# Patient Record
Sex: FEMALE | Race: WHITE | Employment: FULL TIME | URBAN - METROPOLITAN AREA
[De-identification: names, ages, dates, MRNs, and addresses within clinical notes are randomized per-mention and may not be internally consistent; named-entity substitution may affect disease eponyms.]

---

## 2022-02-23 ENCOUNTER — HOSPITAL ENCOUNTER (OUTPATIENT)
Age: 67
Setting detail: OUTPATIENT SURGERY
Discharge: HOME | End: 2022-02-23
Attending: INTERNAL MEDICINE | Admitting: INTERNAL MEDICINE
Payer: MEDICARE

## 2022-02-23 VITALS
HEIGHT: 63 IN | TEMPERATURE: 97.9 F | HEART RATE: 81 BPM | OXYGEN SATURATION: 100 % | WEIGHT: 156.4 LBS | BODY MASS INDEX: 27.71 KG/M2 | DIASTOLIC BLOOD PRESSURE: 63 MMHG | SYSTOLIC BLOOD PRESSURE: 135 MMHG | RESPIRATION RATE: 16 BRPM

## 2022-02-23 PROCEDURE — 88305 TISSUE EXAM BY PATHOLOGIST: CPT

## 2022-02-23 PROCEDURE — G0500 MOD SEDAT ENDO SERVICE >5YRS: HCPCS | Performed by: INTERNAL MEDICINE

## 2022-02-23 PROCEDURE — 77030013991 HC SNR POLYP ENDOSC BSC -A: Performed by: INTERNAL MEDICINE

## 2022-02-23 PROCEDURE — 2709999900 HC NON-CHARGEABLE SUPPLY: Performed by: INTERNAL MEDICINE

## 2022-02-23 PROCEDURE — 77030021593 HC FCPS BIOP ENDOSC BSC -A: Performed by: INTERNAL MEDICINE

## 2022-02-23 PROCEDURE — 99153 MOD SED SAME PHYS/QHP EA: CPT | Performed by: INTERNAL MEDICINE

## 2022-02-23 PROCEDURE — 74011250636 HC RX REV CODE- 250/636: Performed by: INTERNAL MEDICINE

## 2022-02-23 PROCEDURE — 76040000007: Performed by: INTERNAL MEDICINE

## 2022-02-23 RX ORDER — SODIUM CHLORIDE 0.9 % (FLUSH) 0.9 %
5-40 SYRINGE (ML) INJECTION EVERY 8 HOURS
Status: CANCELLED | OUTPATIENT
Start: 2022-02-23

## 2022-02-23 RX ORDER — ATROPINE SULFATE 0.1 MG/ML
0.5 INJECTION INTRAVENOUS
Status: CANCELLED | OUTPATIENT
Start: 2022-02-23 | End: 2022-02-24

## 2022-02-23 RX ORDER — FLUMAZENIL 0.1 MG/ML
0.2 INJECTION INTRAVENOUS
Status: CANCELLED | OUTPATIENT
Start: 2022-02-23 | End: 2022-02-23

## 2022-02-23 RX ORDER — SODIUM CHLORIDE 9 MG/ML
125 INJECTION, SOLUTION INTRAVENOUS CONTINUOUS
Status: DISCONTINUED | OUTPATIENT
Start: 2022-02-23 | End: 2022-02-23 | Stop reason: HOSPADM

## 2022-02-23 RX ORDER — MIDAZOLAM HYDROCHLORIDE 1 MG/ML
INJECTION, SOLUTION INTRAMUSCULAR; INTRAVENOUS AS NEEDED
Status: DISCONTINUED | OUTPATIENT
Start: 2022-02-23 | End: 2022-02-23 | Stop reason: HOSPADM

## 2022-02-23 RX ORDER — DEXTROMETHORPHAN/PSEUDOEPHED 2.5-7.5/.8
1.2 DROPS ORAL
Status: CANCELLED | OUTPATIENT
Start: 2022-02-23

## 2022-02-23 RX ORDER — SODIUM CHLORIDE 0.9 % (FLUSH) 0.9 %
5-40 SYRINGE (ML) INJECTION AS NEEDED
Status: CANCELLED | OUTPATIENT
Start: 2022-02-23

## 2022-02-23 RX ORDER — ZOLPIDEM TARTRATE 10 MG/1
TABLET ORAL
COMMUNITY
Start: 2022-02-15

## 2022-02-23 RX ORDER — EPINEPHRINE 0.1 MG/ML
1 INJECTION INTRACARDIAC; INTRAVENOUS
Status: CANCELLED | OUTPATIENT
Start: 2022-02-23 | End: 2022-02-24

## 2022-02-23 RX ORDER — NALOXONE HYDROCHLORIDE 0.4 MG/ML
0.4 INJECTION, SOLUTION INTRAMUSCULAR; INTRAVENOUS; SUBCUTANEOUS
Status: CANCELLED | OUTPATIENT
Start: 2022-02-23 | End: 2022-02-23

## 2022-02-23 RX ORDER — FENTANYL CITRATE 50 UG/ML
INJECTION, SOLUTION INTRAMUSCULAR; INTRAVENOUS AS NEEDED
Status: DISCONTINUED | OUTPATIENT
Start: 2022-02-23 | End: 2022-02-23 | Stop reason: HOSPADM

## 2022-02-23 RX ADMIN — SODIUM CHLORIDE 125 ML/HR: 9 INJECTION, SOLUTION INTRAVENOUS at 15:37

## 2022-02-23 NOTE — DISCHARGE INSTRUCTIONS
Dominga Anne  [id number]  1955    COLON DISCHARGE INSTRUCTIONS    Discomfort:  Redness at IV site- apply warm compress to area; if redness or soreness persist- contact your physician  There may be a slight amount of blood passed from the rectum  Gaseous discomfort- walking, belching will help relieve any discomfort  You should not operate a vehicle for 12 hours  You should not engage in an occupation involving machinery or appliances for rest of today  You may not drink alcoholic beverages for at least 12 hours  Avoid making any critical decisions for at least 24 hour  DIET:   Regular diet. - however -  remember your colon is empty and a heavy meal will produce gas. Avoid these foods:  vegetables, fried / greasy foods, carbonated drinks for today     ACTIVITY:  You may resume your normal daily activities it is recommended that you spend the remainder of the day resting -  avoid any strenuous activity. CALL M.D. ANY SIGN OF:   Increasing pain, nausea, vomiting  Abdominal distension (swelling)  New increased bleeding (oral or rectal)  Fever (chills)  Pain in chest area  Bloody discharge from nose or mouth  Shortness of breath      Follow-up Instructions: Will call or send letter with polyp pathology results and when next colonoscopy due. Dominga Anne  [id number]  1955        DISCHARGE SUMMARY from Nurse    The following personal items collected during your admission are returned to you:   Dental Appliance: Dental Appliances: None  Vision: Visual Aid: Contacts (pt chooses to wear contacts and not remove)  Hearing Aid:    Jewelry:    Clothing:    Other Valuables:    Valuables sent to safe:      {Medication reconciliation information is now added to the patient's AVS automatically when it is printed. There is no need to use this SmartLink in discharge instructions.   Highlight this text and delete it to clear this message}    DISCHARGE SUMMARY from Nurse    PATIENT INSTRUCTIONS:    After general anesthesia or intravenous sedation, for 24 hours or while taking prescription Narcotics:  · Limit your activities  · Do not drive and operate hazardous machinery  · Do not make important personal or business decisions  · Do  not drink alcoholic beverages  · If you have not urinated within 8 hours after discharge, please contact your surgeon on call. Report the following to your surgeon:  · Excessive pain, swelling, redness or odor of or around the surgical area  · Temperature over 100.5  · Nausea and vomiting lasting longer than 4 hours or if unable to take medications  · Any signs of decreased circulation or nerve impairment to extremity: change in color, persistent  numbness, tingling, coldness or increase pain  · Any questions    What to do at Home:  Recommended activity: {discharge activity:55805}, as above    If you experience any of the following symptoms as above, please follow up with Dr. Bill Walsh. *  Please give a list of your current medications to your Primary Care Provider. *  Please update this list whenever your medications are discontinued, doses are      changed, or new medications (including over-the-counter products) are added. *  Please carry medication information at all times in case of emergency situations. These are general instructions for a healthy lifestyle:    No smoking/ No tobacco products/ Avoid exposure to second hand smoke  Surgeon General's Warning:  Quitting smoking now greatly reduces serious risk to your health.     Obesity, smoking, and sedentary lifestyle greatly increases your risk for illness    A healthy diet, regular physical exercise & weight monitoring are important for maintaining a healthy lifestyle    You may be retaining fluid if you have a history of heart failure or if you experience any of the following symptoms:  Weight gain of 3 pounds or more overnight or 5 pounds in a week, increased swelling in our hands or feet or shortness of breath while lying flat in bed. Please call your doctor as soon as you notice any of these symptoms; do not wait until your next office visit. The discharge information has been reviewed with the patient and spouse. The patient and spouse verbalized understanding. Discharge medications reviewed with the patient and spouse and appropriate educational materials and side effects teaching were provided.   ___________________________________________________________________________________________________________________________________

## 2022-02-23 NOTE — PROCEDURES
Colonoscopy Procedure Note    Indications: Previous adenomatous polyp    Anesthesia/Sedation: Versed 7 mg IV and Fentanyl 150 mcg IV    Pre-Procedure Exam:  Airway: clear   Heart: normal S1and S2    Lungs: clear bilateral  Abdomen: soft, nontender, bowel sounds present and normal in all quadrants   Mental Status: awake, alert, and oriented to person, place, and time      Procedure in Detail:  Informed consent was obtained for the procedure, including sedation. Risks of perforation, hemorrhage, adverse drug reaction, and aspiration were discussed. The patient was placed in the left lateral decubitus position. Based on the pre-procedure assessment, including review of the patient's medical history, medications, allergies, and review of systems, she had been deemed to be an appropriate candidate for moderate sedation; she was therefore sedated with the medications listed above. The patient was monitored continuously with ECG tracing, pulse oximetry, blood pressure monitoring, and direct observations. A rectal examination was performed. The  was inserted, changed secondary tortuous sigmoid to [id number] was inserted into the rectum and advanced with aid of sigmoid pressure under direct vision to the cecum, which was identified by the ileocecal valve and appendiceal orifice. The quality of the colonic preparation was excellent. A careful inspection was made as the colonoscope was withdrawn, including a retroflexed view of the rectum; findings and interventions are described below. Appropriate photodocumentation was obtained. ANUS: Anal exam reveals no masses or hemorrhoids, sphincter tone is normal.   RECTUM: Rectal exam reveals no masses or hemorrhoids. 5mm polyp removed with cold snare  SIGMOID COLON: The mucosa is normal with good vascular pattern and without ulcers and polyps.  Diverticulosis mild  DESCENDING COLON: The mucosa is normal with good vascular pattern and without ulcers, diverticula, and polyps. SPLENIC FLEXURE: The splenic flexure is normal.   TRANSVERSE COLON: The mucosa is normal with good vascular pattern and without ulcers, diverticula, and polyps. HEPATIC FLEXURE: The hepatic flexure is normal.   ASCENDING COLON: The mucosa is normal with good vascular pattern and without ulcers, diverticula  6mm polyp removed with cold snare  CECUM: The appendiceal orifice appears normal. The ileocecal valve appears normal.   TERMINAL ILEUM: The terminal ileum was not entered. Specimens: Specimens were collected and sent to pathology. EBL: None    No prosthetic devices, grafts, tissues, transplant or devices implanted. Withdrawal Time: 11 min    Complications: None; patient tolerated the procedure well. Attending Attestation: I performed the procedure. Recommendations:   - Await pathology.     Signed By: Becky Ortiz MD                      2/23/2022

## 2022-02-23 NOTE — H&P
[facility] [address] [place] [address] [address]  Tel: [phone]  Fax: [phone]    Patient:  Armando Mendoza  YOB: 1955  Birth Sex:  Female  Current Gender:  Female  Date:   11/09/2021 2:20 PM   Historian:    self  Visit Type:   Office Visit          Completed Orders (This Visit)  Order  Details  Reason  Side  Interpretation  Result  Initial Treatment Date  Region  Referrals: Gastroenterology. Carmela Bertrand MD. Evaluate and treat                Weight monitoring                Dietary management education, guidance, and counseling                  Assessment/Plan  #  Detail Type  Description   1. Assessment  Personal history of colonic polyps (Z86.010). Patient Plan  This pleasant 78year old lady is referred by Rei Anguiano for repeat screening colonoscopy. Her last colo was in 2014 with findings of small sessile polyp, diverticulosis of the sigmoid and hemorrhoids with recommendation to return in 5 years. Pertinent negatives include family history of CRC, personal history of cancer, changes in bowel habits, hematochezia, abdominal pain, n&v and unintentional weight loss. She also denies acid reflux and dysphagia. Schedule for COLO with MAC, MiraLax prep. The risks, benefits, adverse reactions were discussed in detail today with the patient. This includes, but is not limited to, the risk of bleeding, infections, perforation, death, missed lesions, reactions to anesthesia/sedation, other. Patient understands and agrees to undergo the procedure         2. Assessment  Body mass index (BMI) 28.0-28.9, adult (A77.35). Plan Orders  Today's instructions / counseling include(s) Dietary management education, guidance, and counseling. Weight monitoring              This 78year old  patient was referred by Flo Avina. This 78year old female presents for Colon Cancer Screening. History of Present Illness  1.   Colon Cancer Screening Prior screening:  Colonoscopy 10/29/2014. Risk Factors: History of Polyp(s). There are no associated symptoms. Pertinent negatives include abdominal pain, change in bowel habits, change in stool caliber, constipation, decreased appetite, diarrhea, melena, nausea, rectal bleeding, vomiting, weight gain and weight loss. Additional information: Colonoscopy 10/29/2014 notated a small Sessile polyp and Diverticulosis and small Internal Hemorrhoids. Problem List  Problem List reviewed. 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 Colon Cancer Screening    Colonoscopy  10/29/2014  Small Sessile polyp, Diverticulosis, Small internal Hemorrhoids       Section          Obstetric History  Not currently pregnant. Family History   (Detailed)    Relationship  Family Member Name    Age at Death  Condition  Onset Age  Cause of Death  Father    N    THROAT CANCER  59  N  Father    Y    Cancer, lung    Y  Mother    N    Hypertension  71  N    Social History  (Detailed)  Tobacco use reviewed. Preferred language is [place]. The patient does not need an .       Education/Employment/Occupation  Employment  History  Status  Retired  Restrictions  [employer]            Marital Status/Family/Social Support  Marital status:     Tobacco use status: Never smoked tobacco.    Smoking status: Never smoker. Tobacco Screening  Patient has never used tobacco. Patient has not used tobacco in the last 30 days. Patient has not used smokeless tobacco in the last 30 days. Smoking Status  Type  Smoking Status  Usage Per Day  Years Used  Pack Years  Total Pack Years    Never smoker            Vaping Use  Screened for vaping? Yes  Status: Not a current user    Tobacco/Vaping Exposure  No passive smoke exposure. Alcohol  There is a history of alcohol use. consumed socially. Caffeine  The patient uses caffeine: Occasional [employer]. Christianity/Spiritual  Patient agrees to transfusion.        Medications (active prior to today)  Medication  Instructions  Start Date  Stop Date  Refilled  Elsewhere  multivitamin capsule  take 1 capsule by oral route  every day  12/04/2012      N  Fish Oil 1,000 mg capsule  take 1 by Oral route  12/04/2012      N  loratadine 10 mg tablet  take 1 tablet by oral route  every day  05/21/2014      N  Premarin 0.625 mg/gram vaginal cream  apply  by Topical route 3 times every week with Q-tip  12/07/2015      N  Tums E-X 300 mg (750 mg) chewable tablet  2 tab Q day  02/08/2017      N  Vitamin C 1,000 mg tablet  1 tablet by mouth once a day  04/24/2018      N  Shingrix (PF) 50 mcg/0.5 mL intramuscular suspension, kit  inject 0.5 milliliter by intramuscular route once  11/07/2018      N  Retin-A 0.025 % topical cream  apply by topical route  every day to the affected area(s)  09/13/2019 09/13/2019  N  fluticasone propionate 50 mcg/actuation nasal spray,suspension  spray 1 - 2 spray by intranasal route  every day in each nostril as needed  07/03/2020 07/03/2020  N  FLUTICASONE PROP 50 MCG SPRAY  INSTILL 1 TO 2 SPRAYS INTO EACH NOSTRIL EVERY DAY  08/30/2021 08/30/2021  N  Ambien CR 6.25 mg tablet,extended release  take 1 tablet by oral route  every day at bedtime as needed  10/06/2021      N  sertraline 25 mg tablet  take 1 tablet by oral route  every day  10/10/2021      N  lorazepam 0.5 mg tablet  take 1-2 Tablet by oral route 2 times every day as needed  10/18/2021    10/18/2021  N    Patient Status   Completed with information received for patient transitioning into care. Completed with information received for patient in a summary of care record. Medication Reconciliation  Medications reconciled today.     Medication Reviewed  Adherence  Medication Name  Sig Desc  Elsewhere  Status  taking as directed  Fish Oil 1,000 mg capsule  take 1 by Oral route  N  Verified  taking as directed  loratadine 10 mg tablet  take 1 tablet by oral route  every day  N  Verified  taking as directed  lorazepam 0.5 mg tablet  take 1-2 Tablet by oral route 2 times every day as needed  N  Verified  taking as directed  Vitamin C 1,000 mg tablet  1 tablet by mouth once a day  N  Verified  taking as directed  FLUTICASONE PROP 50 MCG SPRAY  INSTILL 1 TO 2 SPRAYS INTO EACH NOSTRIL EVERY DAY  N  Verified  taking as directed  Ambien CR 6.25 mg tablet,extended release  take 1 tablet by oral route  every day at bedtime as needed  N  Verified  taking as directed  Retin-A 0.025 % topical cream  apply by topical route  every day to the affected area(s)  N  Verified  taking as directed  Shingrix (PF) 50 mcg/0.5 mL intramuscular suspension, kit  inject 0.5 milliliter by intramuscular route once  N  Verified  taking as directed  sertraline 25 mg tablet  take 1 tablet by oral route  every day  N  Verified  taking as directed  Tums E-X 300 mg (750 mg) chewable tablet  2 tab Q day  N  Verified  taking as directed  fluticasone propionate 50 mcg/actuation nasal spray,suspension  spray 1 - 2 spray by intranasal route  every day in each nostril as needed  N  Verified  taking as directed  Premarin 0.625 mg/gram vaginal cream  apply  by Topical route 3 times every week with Q-tip  N  Verified  taking as directed  multivitamin capsule  take 1 capsule by oral route  every day  N  Verified    Medications (Added, Continued or Stopped today)  Start Date  Medication  Directions  PRN Status  PRN Reason  Instruction  Stop Date  10/06/2021  Ambien CR 6.25 mg tablet,extended release  take 1 tablet by oral route  every day at bedtime as needed  Y        12/04/2012  Fish Oil 1,000 mg capsule  take 1 by Oral route  N        08/30/2021  FLUTICASONE PROP 50 MCG SPRAY  INSTILL 1 TO 2 SPRAYS INTO EACH NOSTRIL EVERY DAY  N        07/03/2020  fluticasone propionate 50 mcg/actuation nasal spray,suspension  spray 1 - 2 spray by intranasal route  every day in each nostril as needed  N        05/21/2014  loratadine 10 mg tablet  take 1 tablet by oral route  every day  N        10/18/2021  lorazepam 0.5 mg tablet  take 1-2 Tablet by oral route 2 times every day as needed  N        12/04/2012  multivitamin capsule  take 1 capsule by oral route  every day  N        12/07/2015  Premarin 0.625 mg/gram vaginal cream  apply  by Topical route 3 times every week with Q-tip  N        09/13/2019  Retin-A 0.025 % topical cream  apply by topical route  every day to the affected area(s)  N        10/10/2021  sertraline 25 mg tablet  take 1 tablet by oral route  every day  N        11/07/2018  Shingrix (PF) 50 mcg/0.5 mL intramuscular suspension, kit  inject 0.5 milliliter by intramuscular route once  N        02/08/2017  Tums E-X 300 mg (750 mg) chewable tablet  2 tab Q day  N        04/24/2018  Vitamin C 1,000 mg tablet  1 tablet by mouth once a day  N          Allergies  Ingredient  Reaction (Severity)  Medication Name  Comment  AMOXICILLIN TRIHYDRATE  itching (mild to moderate)  Augmentin    POTASSIUM CLAVULANATE  gi upset (mild to moderate)  Augmentin    SULFA (SULFONAMIDE ANTIBIOTICS)  itching        Reviewed, no changes.       Orders  Status  Lab Order  Time Frame  Comments  specimen obtained  CBC with Diff      specimen obtained  CMP      specimen obtained  TSH      obtained  CBC with Diff      obtained  CMP      obtained  Lipid Measured LDL      obtained  Vitamin D      ordered  follow-up visit As needed      ordered  DIAGNOSTIC COLONOSCOPY    w/MAC  result received  TSH      result received  Magnesium, Serum      result received  Urine Culture, Routine      result received  Urine Culture, Routine      ordered  Referrals:  Physical Therapy. Evaluate and treat      result received  Histopathology      result received  Histopathology      result received  Urine Culture, Routine      result received  CBC With Differential/Platelet      result received  Lipid Panel With LDL/HDL Ratio      result received  EBV Acute Infection Antibodies      result received  TSH      result received  Comp. Metabolic Panel (14)      result received  Beta Strep Gp A Culture      result received  Urine Culture, Routine      result received  Vitamin D, 25-Hydroxy      result received  CBC With Differential/Platelet      result received  Comp. Metabolic Panel (14)      result received  Lipid Panel With LDL/HDL Ratio      ordered  Referrals: Gastroenterology. Helena Vasquez MD. Consult    Please schedule for January 2020  scheduled  Referrals: Dermatology. Erin Mackenzie MD. Evaluate and treat      ordered  Appropriate post injection instructions given. ordered  CBC With Differential/Platelet      ordered  Comp Metabolic Panel (14) AU      ordered  Lipid Panel calc LDL      ordered  Vitamin D 25      completed  Referrals: Gastroenterology. Gia Faith MD. Evaluate and treat      completed  Weight monitoring      completed  Dietary management education, guidance, and counseling      ordered  DIAGNOSTIC COLONOSCOPY          Review of Systems  System  Neg/Pos  Details  Constitutional  Negative  Chills, Fever, Weight gain and Weight loss. ENMT  Negative  Ear infections and Nasal congestion.   Respiratory  Negative  Chronic cough, Dyspnea and Wheezing. Cardio  Negative  Chest pain. GI  Negative  Abdominal pain, Change in bowel habits, Change in stool caliber, Constipation, Decreased appetite, Diarrhea, Melena, Nausea, Rectal bleeding and Vomiting.   Negative  Dysuria. Endocrine  Negative  Cold intolerance and Heat intolerance. Neuro  Negative  Dizziness and Headache. Psych  Negative  Anxiety and Depression. MS  Negative  Back pain and Joint pain. Hema/Lymph  Negative  Easy bleeding and Easy bruising. Vital Signs  Height  Time  ft  in  cm  Last Measured  Height Position  2:12 PM  5.0  2.50  158.75  11/09/2021  Standing    Weight/BSA/BMI  Time  lb  oz  kg  Context  BMI kg/m2  BSA m2  2:12 PM  160.00    72.575  dressed without shoes  28.80      Blood Pressure  Time  BP mm/Hg  Position  Side  Site  Method  Cuff Size  2:12 PM  118/74  sitting  left  arm  manual  adult    Temperature/Pulse/Respiration  Time  Temp F  Temp C  Temp Site  Pulse/min  Pattern  Resp/ min  2:12 PM  96.90  36.06    66        Pulse Oximetry/FIO2  Time  Pulse Ox (Rest %)  Pulse Ox (Amb %)  O2 Sat  O2 L/Min  Timing  FiO2 %  L/min  Delivery Method  Finger Probe  2:12 PM  98                    Measured by  Time  Measured by  2:12 PM  Jeromy Overton    Physical  Exam  Exam  Findings  Details  Constitutional  Normal  No acute distress. Well nourished. Well developed.   Head/Face  Normal  Facial features - Normal. Eyebrows - Normal.  Eyes  Normal  General - Right: Normal, Left: Normal. Lids/external - Right: Normal, Left: Normal. Conjunctiva - Right: Normal, Left: Normal. Sclera - Right: Normal, Left: Normal.  Ears  Normal  Inspection - Right: Normal, Left: Normal.  Nose/Mouth/Throat  Normal  External nose - Normal. Lips/teeth/gums - Normal.  Nose/Mouth/Throat  Normal  External nose - Normal. Septum - Normal. Lips/teeth/gums - Normal. Tongue - Normal.  Neck Exam  Normal  Inspection - Normal. Palpation - Normal. Thyroid gland - Normal. Range of motion - Normal.  Respiratory  Normal  Inspection - Normal. Auscultation - Normal. Effort - Normal.  Cardiovascular  Normal  Inspection - JVD: Absent. Palpation/percussion - PMI normal. Heart rate - Regular rate. Rhythm - Regular. Heart sounds - Normal S1, Normal S2. Abdomen  Normal  Patient is not obese. Inspection - Normal. Appliance(s) - None. Abdominal muscles - Normal. Auscultation - Normal. Percussion - Normal. Anterior palpation - Normal, No guarding, No rebound. CVA tenderness - None. Umbilicus - Normal. No abdominal tenderness. No hepatic enlargement. No spleen enlargement. No hernia. No ascites. No palpable mass. Cwoart's sign - Normal. No hepatic tenderness. Genitourinary  Normal  No CVA Tenderness. No flank mass. Skin  *  Body areas examined - Scalp/hair, Head/face, Neck, Abdomen. Skin  Normal  Inspection - Normal. Nails - Normal. Hair - Normal.  Musculoskeletal  Normal  Overview - Normal. Hands - Left: Normal, Right: Normal.  Neurological  Normal  Level of consciousness - Normal. Orientation - Normal. Memory - Normal. Cranial nerves - Cranial nerves II through XII grossly intact. Sensory - Normal. Motor - Normal. Balance & gait - Normal. Coordination - Normal.  Psychiatric  Normal  Orientation - Oriented to time, place, person & situation. Not anxious. Behavior is appropriate for age. Sufficient fund of knowledge. Sufficient language. No memory loss. No mood swings. Normal insight. Normal judgment.   Immunizations Entered by History  Date  Immunization  10/1/2019 12:00:00 AM  Shingrix  7/11/2019 12:00:00 AM  Shingrix      Active Patient Care Team Members  Name  Contact  Agency Type  Support Role  Relationship  Active Date  Inactive Date  Specialty  [facility]      Emergency Contact  Spouse        [place] Trina      Patient provider  PCP      Elyssa      encounter provider        Gastroenterology  Ryne Zhu      encounter provider        Physicians Assistant      I was available at the time of service and agree with the plan of care on 11/9/2021    Encounter submitted for review by Enrique HARRISON on 11/09/2021 4:03 PM.    Visit details reviewed and approved by supervising provider Radha Das MD on 11/09/2021. Document generated by: Radha Das 11/09/2021     Providers  [facility]   [address] [facility] [place]-    ----------------------------------------------------------------------------------------------------------------------------------------------------------------------      Electronically signed by Araceli Kaye on 11/09/2021 04:24 PM  on behalf of Enrique HARRISON    Patient seen and examined. NO interval change.

## (undated) DEVICE — MOUTHPIECE ENDOSCP 20X27MM --

## (undated) DEVICE — KENDALL RADIOLUCENT FOAM MONITORING ELECTRODE RECTANGULAR SHAPE: Brand: KENDALL

## (undated) DEVICE — SINGLE PORT MANIFOLD: Brand: NEPTUNE 2

## (undated) DEVICE — ALL PURPOSE SPONGES,NON-WOVEN, 4 PLY: Brand: CURITY

## (undated) DEVICE — TRAP SPEC COLL POLYP POLYSTYR --

## (undated) DEVICE — SYR 3ML LL TIP 1/10ML GRAD --

## (undated) DEVICE — SET ADMIN 16ML TBNG L100IN 2 Y INJ SITE IV PIGGY BK DISP

## (undated) DEVICE — Device

## (undated) DEVICE — AIRLIFE™ NASAL OXYGEN CANNULA CURVED, FLARED TIP WITH 14 FOOT (4.3 M) CRUSH-RESISTANT TUBING, OVER-THE-EAR STYLE: Brand: AIRLIFE™

## (undated) DEVICE — ENDO CARRY-ON PROCEDURE KIT INCLUDES ENZYMATIC SPONGE, GAUZE, BIOHAZARD LABEL, TRAY, LUBRICANT, DIRTY SCOPE LABEL, WATER LABEL, TRAY, DRAWSTRING PAD, AND DEFENDO 4-PIECE KIT.: Brand: ENDO CARRY-ON PROCEDURE KIT

## (undated) DEVICE — NDL PRT INJ NSAF BLNT 18GX1.5 --

## (undated) DEVICE — SYR 50ML SLIP TIP NSAF LF STRL --

## (undated) DEVICE — FORCEPS BX CAP 240CM L RAD JAW 4

## (undated) DEVICE — EJECTOR SALIVA 6 IN FLX CLR

## (undated) DEVICE — TRNQT TEXT 1X18IN BLU LF DISP -- CONVERT TO ITEM 362165

## (undated) DEVICE — CATH IV SAFE STR 22GX1IN BLU -- PROTECTIV PLUS

## (undated) DEVICE — 4-PORT MANIFOLD: Brand: NEPTUNE 2

## (undated) DEVICE — BRUSH CYTO L240CM DIA2.3MM GI COLONOSCOPY 3 RNG HNDL RADPQ

## (undated) DEVICE — SNARE POLYP SM W13MMXL240CM SHTH DIA2.4MM OVL FLX DISP

## (undated) DEVICE — MAJ-1414 SINGLE USE ADPATER BIOPSY VALV: Brand: SINGLE USE ADAPTOR BIOPSY VALVE